# Patient Record
Sex: FEMALE | Race: BLACK OR AFRICAN AMERICAN | NOT HISPANIC OR LATINO | URBAN - METROPOLITAN AREA
[De-identification: names, ages, dates, MRNs, and addresses within clinical notes are randomized per-mention and may not be internally consistent; named-entity substitution may affect disease eponyms.]

---

## 2024-05-12 ENCOUNTER — EMERGENCY (EMERGENCY)
Facility: HOSPITAL | Age: 34
LOS: 1 days | Discharge: HOME | End: 2024-05-12
Attending: EMERGENCY MEDICINE | Admitting: EMERGENCY MEDICINE
Payer: MEDICAID

## 2024-05-12 VITALS
HEART RATE: 60 BPM | RESPIRATION RATE: 16 BRPM | SYSTOLIC BLOOD PRESSURE: 127 MMHG | TEMPERATURE: 98 F | DIASTOLIC BLOOD PRESSURE: 75 MMHG | OXYGEN SATURATION: 100 %

## 2024-05-12 VITALS
HEART RATE: 61 BPM | OXYGEN SATURATION: 100 % | SYSTOLIC BLOOD PRESSURE: 134 MMHG | RESPIRATION RATE: 15 BRPM | DIASTOLIC BLOOD PRESSURE: 85 MMHG

## 2024-05-12 LAB
ALBUMIN SERPL ELPH-MCNC: 4.3 G/DL — SIGNIFICANT CHANGE UP (ref 3.3–5)
ALP SERPL-CCNC: 44 U/L — SIGNIFICANT CHANGE UP (ref 40–120)
ALT FLD-CCNC: 47 U/L — HIGH (ref 4–33)
ANION GAP SERPL CALC-SCNC: 14 MMOL/L — SIGNIFICANT CHANGE UP (ref 7–14)
APPEARANCE UR: ABNORMAL
APTT BLD: 26.7 SEC — SIGNIFICANT CHANGE UP (ref 24.5–35.6)
AST SERPL-CCNC: 32 U/L — SIGNIFICANT CHANGE UP (ref 4–32)
BASOPHILS # BLD AUTO: 0.04 K/UL — SIGNIFICANT CHANGE UP (ref 0–0.2)
BASOPHILS NFR BLD AUTO: 0.5 % — SIGNIFICANT CHANGE UP (ref 0–2)
BILIRUB SERPL-MCNC: 0.3 MG/DL — SIGNIFICANT CHANGE UP (ref 0.2–1.2)
BILIRUB UR-MCNC: NEGATIVE — SIGNIFICANT CHANGE UP
BLD GP AB SCN SERPL QL: NEGATIVE — SIGNIFICANT CHANGE UP
BUN SERPL-MCNC: 10 MG/DL — SIGNIFICANT CHANGE UP (ref 7–23)
CALCIUM SERPL-MCNC: 9.1 MG/DL — SIGNIFICANT CHANGE UP (ref 8.4–10.5)
CHLORIDE SERPL-SCNC: 106 MMOL/L — SIGNIFICANT CHANGE UP (ref 98–107)
CO2 SERPL-SCNC: 22 MMOL/L — SIGNIFICANT CHANGE UP (ref 22–31)
COLOR SPEC: YELLOW — SIGNIFICANT CHANGE UP
CREAT SERPL-MCNC: 0.78 MG/DL — SIGNIFICANT CHANGE UP (ref 0.5–1.3)
DIFF PNL FLD: ABNORMAL
EGFR: 103 ML/MIN/1.73M2 — SIGNIFICANT CHANGE UP
EOSINOPHIL # BLD AUTO: 0.09 K/UL — SIGNIFICANT CHANGE UP (ref 0–0.5)
EOSINOPHIL NFR BLD AUTO: 1 % — SIGNIFICANT CHANGE UP (ref 0–6)
GLUCOSE SERPL-MCNC: 82 MG/DL — SIGNIFICANT CHANGE UP (ref 70–99)
GLUCOSE UR QL: NEGATIVE MG/DL — SIGNIFICANT CHANGE UP
HCG SERPL-ACNC: 8230 MIU/ML — SIGNIFICANT CHANGE UP
HCT VFR BLD CALC: 35.4 % — SIGNIFICANT CHANGE UP (ref 34.5–45)
HCV AB S/CO SERPL IA: 0.13 S/CO — SIGNIFICANT CHANGE UP (ref 0–0.99)
HCV AB SERPL-IMP: SIGNIFICANT CHANGE UP
HGB BLD-MCNC: 11.9 G/DL — SIGNIFICANT CHANGE UP (ref 11.5–15.5)
HIV 1+2 AB+HIV1 P24 AG SERPL QL IA: SIGNIFICANT CHANGE UP
IANC: 6.04 K/UL — SIGNIFICANT CHANGE UP (ref 1.8–7.4)
IMM GRANULOCYTES NFR BLD AUTO: 0.2 % — SIGNIFICANT CHANGE UP (ref 0–0.9)
INR BLD: 0.99 RATIO — SIGNIFICANT CHANGE UP (ref 0.85–1.18)
KETONES UR-MCNC: ABNORMAL MG/DL
LEUKOCYTE ESTERASE UR-ACNC: ABNORMAL
LYMPHOCYTES # BLD AUTO: 2 K/UL — SIGNIFICANT CHANGE UP (ref 1–3.3)
LYMPHOCYTES # BLD AUTO: 22.8 % — SIGNIFICANT CHANGE UP (ref 13–44)
MCHC RBC-ENTMCNC: 30.1 PG — SIGNIFICANT CHANGE UP (ref 27–34)
MCHC RBC-ENTMCNC: 33.6 GM/DL — SIGNIFICANT CHANGE UP (ref 32–36)
MCV RBC AUTO: 89.6 FL — SIGNIFICANT CHANGE UP (ref 80–100)
MONOCYTES # BLD AUTO: 0.57 K/UL — SIGNIFICANT CHANGE UP (ref 0–0.9)
MONOCYTES NFR BLD AUTO: 6.5 % — SIGNIFICANT CHANGE UP (ref 2–14)
NEUTROPHILS # BLD AUTO: 6.04 K/UL — SIGNIFICANT CHANGE UP (ref 1.8–7.4)
NEUTROPHILS NFR BLD AUTO: 69 % — SIGNIFICANT CHANGE UP (ref 43–77)
NITRITE UR-MCNC: NEGATIVE — SIGNIFICANT CHANGE UP
NRBC # BLD: 0 /100 WBCS — SIGNIFICANT CHANGE UP (ref 0–0)
NRBC # FLD: 0 K/UL — SIGNIFICANT CHANGE UP (ref 0–0)
PH UR: 6 — SIGNIFICANT CHANGE UP (ref 5–8)
PLATELET # BLD AUTO: 277 K/UL — SIGNIFICANT CHANGE UP (ref 150–400)
POTASSIUM SERPL-MCNC: 4 MMOL/L — SIGNIFICANT CHANGE UP (ref 3.5–5.3)
POTASSIUM SERPL-SCNC: 4 MMOL/L — SIGNIFICANT CHANGE UP (ref 3.5–5.3)
PROT SERPL-MCNC: 7.4 G/DL — SIGNIFICANT CHANGE UP (ref 6–8.3)
PROT UR-MCNC: 100 MG/DL
PROTHROM AB SERPL-ACNC: 11.2 SEC — SIGNIFICANT CHANGE UP (ref 9.5–13)
RBC # BLD: 3.95 M/UL — SIGNIFICANT CHANGE UP (ref 3.8–5.2)
RBC # FLD: 12.3 % — SIGNIFICANT CHANGE UP (ref 10.3–14.5)
RBC CASTS # UR COMP ASSIST: SIGNIFICANT CHANGE UP /HPF (ref 0–4)
RH IG SCN BLD-IMP: POSITIVE — SIGNIFICANT CHANGE UP
SODIUM SERPL-SCNC: 142 MMOL/L — SIGNIFICANT CHANGE UP (ref 135–145)
SP GR SPEC: 1.04 — HIGH (ref 1–1.03)
UROBILINOGEN FLD QL: 1 MG/DL — SIGNIFICANT CHANGE UP (ref 0.2–1)
WBC # BLD: 8.76 K/UL — SIGNIFICANT CHANGE UP (ref 3.8–10.5)
WBC # FLD AUTO: 8.76 K/UL — SIGNIFICANT CHANGE UP (ref 3.8–10.5)
WBC UR QL: SIGNIFICANT CHANGE UP /HPF (ref 0–5)

## 2024-05-12 PROCEDURE — 99285 EMERGENCY DEPT VISIT HI MDM: CPT

## 2024-05-12 PROCEDURE — 76830 TRANSVAGINAL US NON-OB: CPT | Mod: 26

## 2024-05-12 RX ORDER — KETOROLAC TROMETHAMINE 30 MG/ML
15 SYRINGE (ML) INJECTION ONCE
Refills: 0 | Status: DISCONTINUED | OUTPATIENT
Start: 2024-05-12 | End: 2024-05-12

## 2024-05-12 RX ADMIN — Medication 15 MILLIGRAM(S): at 08:12

## 2024-05-12 RX ADMIN — Medication 15 MILLIGRAM(S): at 08:42

## 2024-05-12 NOTE — ED PROVIDER NOTE - CLINICAL SUMMARY MEDICAL DECISION MAKING FREE TEXT BOX
34 year old female with no PMH comes into the ED for eval of suprapubic abd pain. She had a surgical  done on  at 7 week gestation. She initially had no pain or significant discharge. Yesterday evening she started to have intermittent sharp suprapubic pain which has persisted into today. Over the last two days she has started to have some red-brown colored vaginal discharge that is similar to her menstrual period. Ddx includes but not limited to retained products of conception, UTI. Will draw CBC, CMP, coags, type and scree, UA, HIV Hep C, and trans vag US. Dispo pending work up and reeval.

## 2024-05-12 NOTE — ED PROVIDER NOTE - PROGRESS NOTE DETAILS
Cholo Chavira PGY2:  updated pt about labs and US. Pt states she feels great and has no pain at this time. Gyn at bedside evaluating pt. Cholo Chavira PGY2:  Pt was evaluated by Gyn. Images were reviewed by gyn attending. Given Pt's well appearance no intervention at this time. Reviewed return precautions as well and advised she take tylenol/motrin for pain. Pt agreeable with plan. ready for DC and she will follow up with her Gyn.

## 2024-05-12 NOTE — CONSULT NOTE ADULT - SUBJECTIVE AND OBJECTIVE BOX
ADRI HAIR  34y  Female [id number]    HPI:  34 P2 LMP 3/15 had a D&C for an unplanned unwanted pregnancy at 7w6d at [facility] on . States procedure was uncomplicated and that she was given antibiotics. Came to ED after Karaoke party last night and having 8/10 pelvic pain. Patient states no fevers and pelvic pain non existent after NSAIDs in the ED. States vaginal bleeding is like a mild periods.       Name of GYN Physician: [facility]  OBHx:   x2, D&C x2   GynHx: Denies fibroids, cysts, endometriosis, STI's, Abnormal pap smears   PMH: Denies  PSH: Denies  Meds: Denies  Allx: NKDA    Vital Signs Last 24 Hrs  T(C): 36.7 (12 May 2024 12:22), Max: 37 (12 May 2024 08:44)  T(F): 98.1 (12 May 2024 12:22), Max: 98.6 (12 May 2024 08:44)  HR: 60 (12 May 2024 12:22) (60 - 63)  BP: 127/75 (12 May 2024 12:22) (123/62 - 134/85)  BP(mean): --  RR: 16 (12 May 2024 12:22) (15 - 16)  SpO2: 100% (12 May 2024 12:22) (100% - 100%)    Parameters below as of 12 May 2024 12:22  Patient On (Oxygen Delivery Method): room air        Physical Exam:   General: sitting comfortably in bed, NAD   Lungs: breathing comfortably on RA  Abd: Soft, non-tender, non-distended.   :  Minimal bleeding on pad.    External labia wnl.  Bimanual exam with no cervical motion tenderness, uterus wnl, adnexa non palpable b/l.  Cervix closed  Speculum Exam: No active bleeding from os. Small amount of old blood in vaginal canal <5cc       LABS:                              11.9   8.76  )-----------( 277      ( 12 May 2024 08:07 )             35.4     -    142  |  106  |  10  ----------------------------<  82  4.0   |  22  |  0.78    Ca    9.1      12 May 2024 08:07    TPro  7.4  /  Alb  4.3  /  TBili  0.3  /  DBili  x   /  AST  32  /  ALT  47<H>  /  AlkPhos  44  05-12    I&O's Detail    PT/INR - ( 12 May 2024 08:07 )   PT: 11.2 sec;   INR: 0.99 ratio         PTT - ( 12 May 2024 08:07 )  PTT:26.7 sec  Urinalysis Basic - ( 12 May 2024 08:21 )    Color: Yellow / Appearance: Cloudy / S.041 / pH: x  Gluc: x / Ketone: Trace mg/dL  / Bili: Negative / Urobili: 1.0 mg/dL   Blood: x / Protein: 100 mg/dL / Nitrite: Negative   Leuk Esterase: Trace / RBC: 2-5 /HPF / WBC 0-2 /HPF   Sq Epi: x / Non Sq Epi: x / Bacteria: x        RADIOLOGY & ADDITIONAL STUDIES:    ACC: [id number] EXAM:  US TRANSVAGINAL   ORDERED BY: HOLA WICK     PROCEDURE DATE:  2024          INTERPRETATION:  CLINICAL INFORMATION: Suprapubic pain, waxing and   waning, 1 day with vaginal bleeding, status post surgical    24, concern for retained products of conception    LMP: 3/15/2024    COMPARISON: No pertinent priors.    TECHNIQUE:  Endovaginal pelvic sonogram only. Color and Spectral Doppler was   performed.    FINDINGS:  Uterus: 11.5 cm x 6.2 cm x 5.3 cm. intramural anterior fibroid measuring   0.9 x 0.8 x 0.8 cm. Anterior fundal/intramural fibroid measuring 0.9 x   0.8 x 0.8 cm.  Endometrium: 10 mm. Heterogeneous echotexture of the endometrium   identified. Increased blood flow noted within the endometrial region.   Retained products of conception cannot be excluded.    Right ovary: 2.4 cm x 2.1 cm x 3.3 cm. Within normal limits. Normal   arterial and venous waveforms.  Left ovary: 2.8 cm x 2.1 cm x 1.8 cm. Complex cystic structure identified   within theleft ovarian parenchyma measuring 1.2 x 1.0 x 0.6 cm, possible   corpus luteal cyst. Normal arterial and venous waveforms.    Fluid: No free pelvic fluid.    IMPRESSION:  Heterogeneous echotexture of the endometrium identified. Increased blood   flowwithin the endometrial region noted. Retained products of conception   cannot be excluded. Complex cystic structure identified within the left   ovarian parenchyma measuring 1.2 x 1.0 x 0.6 cm, possible corpus luteal   cyst.    --- End of Report ---

## 2024-05-12 NOTE — ED PROVIDER NOTE - PHYSICAL EXAMINATION
GENERAL: Not in acute distress, non-toxic appearing  HEAD: normocephalic, atraumatic  HEENT: EOMI, normal conjunctiva, oral mucosa moist, neck supple  CARDIAC: appears well perfused  PULM: normal work of breathing  GI: abdomen nondistended, + suprapubic tenderness,   : cervix is closed with rust colored discharge from cervical os, no CMT, no adnexal tenderness bilat  NEURO: alert and oriented x 3, normal speech, no focal motor or sensory deficits, no gross neurologic deficit  MSK: No visible deformities, no peripheral edema,   SKIN: No visible rashes, dry, well-perfused  PSYCH: appropriate mood and affect

## 2024-05-12 NOTE — ED PROVIDER NOTE - PATIENT PORTAL LINK FT
Refill request for lovaza, levothyroxine, and omeprazole, refill protocol passed,   LOV 4/26/23  NOV not scheduled  Last labs 4/26/23  
You can access the FollowMyHealth Patient Portal offered by [facility] by registering at the following website: [email or web address] By joining [facility]’s FollowMyHealth portal, you will also be able to view your health information using other applications (apps) compatible with our system.

## 2024-05-12 NOTE — ED PROVIDER NOTE - IV ALTEPLASE INCLUSION HIDDEN
Pt and brother given discharge instructions and RX with understanding. Pt ambulatory out of ED steady gait in no distress     show

## 2024-05-12 NOTE — ED PROVIDER NOTE - OBJECTIVE STATEMENT
34 year old female with no PMH comes into the ED for eval of suprapubic abd pain. She had a surgical  done on  at 7 week gestation. She initially had no pain or significant discharge. Yesterday evening she started to have intermittent sharp suprapubic pain which has persisted into today. Over the last two days she has started to have some red-brown colored vaginal discharge that is similar to her menstrual period. She has not taken anything for pain. She denies any fevers, chills, nausea, vomiting, urinary burning, dysuria, or frequency. She reports she had very similar pain when she had an  many years ago.

## 2024-05-12 NOTE — ED ADULT NURSE NOTE - OBJECTIVE STATEMENT
Received pt in intake. Pt is A&Ox4 with no past medical history. Pt came to the ED due to having abdominal pain. Pt reports that she had an  done on  at 7 weeks gestation. Pt now has some vaginal discharge redish brown color for 2 days. Pt abdomen is soft and nondistended. Pt breathing is equal and nonlabored. Pt denies any chest pain, SOB, headache, nausea, vomiting, diarrhea, fever or chills. Pt has a 20g to the left AC. pt safety maintained,

## 2024-05-12 NOTE — CONSULT NOTE ADULT - ASSESSMENT
34 P2 LMP 3/15 had a D&C for an unplanned unwanted pregnancy at 7w6d at [facility] on 5/8. States procedure was uncomplicated and that she was given antibiotics. Came to ED after Karaoke party last night and having 8/10 pelvic pain. Patient states no fevers and pelvic pain non existent after NSAIDs in the ED. States vaginal bleeding is like a mild periods.     Plan  -No acute GYN interventions  -TVUS reviewed, stripe wnl for recent procedure  -VSS, H/H 11/35  no sxs of anemia  -BT: O+  -return precautions concerning pelvic pain, vaginal bleeding and fevers were given.  - Take Ibuprofen 600mg every 6 hours and/or Tylenol 975mg every 6 hours     Blaine PGY2  dw Dr. Turner

## 2024-05-12 NOTE — ED PROVIDER NOTE - ATTENDING CONTRIBUTION TO CARE
Nathaniel: I have seen and examined the patient face to face, have reviewed and addended the HPI, PE and a/p as necessary.     35 yo F with no PMH a/w lower abdominal pain.  Pt reports symptoms started last night intermittent in nature going from 7 to 10/10.  Currently mild right now.  Pt reports having surgical  done on  at 7 week gestation. She initially had no pain or significant discharge, completed prophylactic antibiotics.  Over the last two days she reports some red-brown colored vaginal discharge that is similar to her menstrual period. Denies any pain meds.      Denies any fevers, chills, nausea, vomiting, urinary burning, dysuria, or frequency. She reports she had very similar pain when she had an  many years ago.    GEN - NAD; well appearing; A+O x3; non-toxic appearing  CARD -s1s2, RRR, no M,G,R;   PULM - CTA b/l, symmetric breath sounds;   ABD -  +BS, ND, mild tenderness in lower abd over bladder, soft, no guarding, no rebound, no masses;   BACK - no CVA tenderness, Normal  spine;   EXT - symmetric pulses, 2+ dp, capillary refill < 2 seconds, no cyanosis, no edema;   NEURO - no focal neuro deficits, no slurred speech    35 yo F with no PMH a/w lower abdominal pain in the setting of recent surgical , concern for possible retained products of conception, pain well controlled, will check cbc, cmp, hcg, ua to r/o uti, g/c.  Offered and accepted hiv and hep c.  Will cont to monitor.  Re-eval after toradol for pain.

## 2024-05-12 NOTE — ED ADULT TRIAGE NOTE - CHIEF COMPLAINT QUOTE
presents C/O lower abd pain. and vag spotting. S/P  4 days ago. No complaints of chest pain, headache, nausea, dizziness, vomiting  SOB, fever, chills verbalized. no phx

## 2024-05-12 NOTE — ED ADULT NURSE REASSESSMENT NOTE - NS ED NURSE REASSESS COMMENT FT1
Patient received into my care, alert and oriented x 4, ambulatory. No verbal ocmplaints at this time. Waiting for test results, patient kept NPO for ultrasound to be booked. IV insitu, vital stable, afebrile. Needs attended. Bed in lowest position.

## 2024-05-12 NOTE — ED PROVIDER NOTE - NSFOLLOWUPCLINICSTOKEN_GEN_ALL_ED_FT
[id number]: || ||00\01||False;[id number]: || ||00\01||False;[id number]: || ||00\01||False;[id number]: || ||00\01||False;

## 2024-05-12 NOTE — ED PROVIDER NOTE - NSFOLLOWUPCLINICS_GEN_ALL_ED_FT
[facility]  OB/GYN & Surg  [address] [place] [id number]  Phone: [phone]  Fax:     [facility]  Gynceology/OB  [address] [place] [address]  Phone: [phone]  Fax:     [facility]  OB-GYN  [address] [facility] [place] [address]  Phone: [phone]  Fax:     [facility]  OBGYN  [address] [place] [address]  Phone: [phone]  Fax: [phone]

## 2024-05-13 LAB
C TRACH RRNA SPEC QL NAA+PROBE: SIGNIFICANT CHANGE UP
CULTURE RESULTS: SIGNIFICANT CHANGE UP
N GONORRHOEA RRNA SPEC QL NAA+PROBE: SIGNIFICANT CHANGE UP
SPECIMEN SOURCE: SIGNIFICANT CHANGE UP